# Patient Record
Sex: FEMALE | Race: OTHER | Employment: OTHER | URBAN - METROPOLITAN AREA
[De-identification: names, ages, dates, MRNs, and addresses within clinical notes are randomized per-mention and may not be internally consistent; named-entity substitution may affect disease eponyms.]

---

## 2024-03-13 ENCOUNTER — HOSPITAL ENCOUNTER (OUTPATIENT)
Dept: BONE DENSITY | Facility: HOSPITAL | Age: 74
Discharge: HOME | End: 2024-03-13
Attending: INTERNAL MEDICINE
Payer: MEDICARE

## 2024-03-13 DIAGNOSIS — M81.0 AGE-RELATED OSTEOPOROSIS WITHOUT CURRENT PATHOLOGICAL FRACTURE: ICD-10-CM

## 2024-03-13 PROCEDURE — 77080 DXA BONE DENSITY AXIAL: CPT | Performed by: INTERNAL MEDICINE

## 2024-05-12 ENCOUNTER — APPOINTMENT (OUTPATIENT)
Dept: CT IMAGING | Facility: HOSPITAL | Age: 74
End: 2024-05-12
Attending: EMERGENCY MEDICINE

## 2024-05-12 ENCOUNTER — HOSPITAL ENCOUNTER (EMERGENCY)
Facility: HOSPITAL | Age: 74
Discharge: HOME | End: 2024-05-12
Attending: EMERGENCY MEDICINE

## 2024-05-12 VITALS
TEMPERATURE: 98 F | DIASTOLIC BLOOD PRESSURE: 63 MMHG | BODY MASS INDEX: 45.36 KG/M2 | SYSTOLIC BLOOD PRESSURE: 129 MMHG | OXYGEN SATURATION: 95 % | HEIGHT: 59 IN | WEIGHT: 225 LBS | RESPIRATION RATE: 22 BRPM | HEART RATE: 78 BPM

## 2024-05-12 DIAGNOSIS — R41.0 CONFUSION: ICD-10-CM

## 2024-05-12 DIAGNOSIS — D72.829 LEUKOCYTOSIS, UNSPECIFIED TYPE: ICD-10-CM
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
## 2024-05-12 LAB
ANION GAP SERPL CALC-SCNC: 6 MMOL/L (ref 0–18)
BASOPHILS # BLD AUTO: 0.12 X10(3) UL (ref 0–0.2)
BASOPHILS NFR BLD AUTO: 0.8 %
BILIRUB UR QL: NEGATIVE
BUN BLD-MCNC: 11 MG/DL (ref 9–23)
BUN/CREAT SERPL: 12.4 (ref 10–20)
CALCIUM BLD-MCNC: 9.5 MG/DL (ref 8.7–10.4)
CHLORIDE SERPL-SCNC: 107 MMOL/L (ref 98–112)
CLARITY UR: CLEAR
CO2 SERPL-SCNC: 27 MMOL/L (ref 21–32)
COLOR UR: YELLOW
CREAT BLD-MCNC: 0.89 MG/DL
DEPRECATED RDW RBC AUTO: 49.6 FL (ref 35.1–46.3)
EGFRCR SERPLBLD CKD-EPI 2021: 69 ML/MIN/1.73M2 (ref 60–?)
EOSINOPHIL # BLD AUTO: 0.78 X10(3) UL (ref 0–0.7)
EOSINOPHIL NFR BLD AUTO: 5.4 %
ERYTHROCYTE [DISTWIDTH] IN BLOOD BY AUTOMATED COUNT: 15 % (ref 11–15)
GLUCOSE BLD-MCNC: 268 MG/DL (ref 70–99)
GLUCOSE BLDC GLUCOMTR-MCNC: 270 MG/DL (ref 70–99)
GLUCOSE UR-MCNC: 200 MG/DL
HCT VFR BLD AUTO: 45.9 %
HGB BLD-MCNC: 14.7 G/DL
HGB UR QL STRIP.AUTO: NEGATIVE
IMM GRANULOCYTES # BLD AUTO: 0.17 X10(3) UL (ref 0–1)
IMM GRANULOCYTES NFR BLD: 1.2 %
KETONES UR-MCNC: NEGATIVE MG/DL
LEUKOCYTE ESTERASE UR QL STRIP.AUTO: 500
LYMPHOCYTES # BLD AUTO: 2.21 X10(3) UL (ref 1–4)
LYMPHOCYTES NFR BLD AUTO: 15.3 %
MCH RBC QN AUTO: 29.2 PG (ref 26–34)
MCHC RBC AUTO-ENTMCNC: 32 G/DL (ref 31–37)
MCV RBC AUTO: 91.3 FL
MONOCYTES # BLD AUTO: 1.13 X10(3) UL (ref 0.1–1)
MONOCYTES NFR BLD AUTO: 7.8 %
NEUTROPHILS # BLD AUTO: 10.01 X10 (3) UL (ref 1.5–7.7)
NEUTROPHILS # BLD AUTO: 10.01 X10(3) UL (ref 1.5–7.7)
NEUTROPHILS NFR BLD AUTO: 69.5 %
NITRITE UR QL STRIP.AUTO: NEGATIVE
OSMOLALITY SERPL CALC.SUM OF ELEC: 299 MOSM/KG (ref 275–295)
PH UR: 5.5 [PH] (ref 5–8)
PLATELET # BLD AUTO: 244 10(3)UL (ref 150–450)
POTASSIUM SERPL-SCNC: 3.9 MMOL/L (ref 3.5–5.1)
RBC # BLD AUTO: 5.03 X10(6)UL
SODIUM SERPL-SCNC: 140 MMOL/L (ref 136–145)
SP GR UR STRIP: 1.02 (ref 1–1.03)
UROBILINOGEN UR STRIP-ACNC: NORMAL
WBC # BLD AUTO: 14.4 X10(3) UL (ref 4–11)
WBC #/AREA URNS AUTO: >50 /HPF

## 2024-05-12 PROCEDURE — 99285 EMERGENCY DEPT VISIT HI MDM: CPT

## 2024-05-12 PROCEDURE — 70450 CT HEAD/BRAIN W/O DYE: CPT | Performed by: EMERGENCY MEDICINE

## 2024-05-12 PROCEDURE — 82962 GLUCOSE BLOOD TEST: CPT

## 2024-05-12 PROCEDURE — 93010 ELECTROCARDIOGRAM REPORT: CPT

## 2024-05-12 PROCEDURE — 81001 URINALYSIS AUTO W/SCOPE: CPT | Performed by: EMERGENCY MEDICINE

## 2024-05-12 PROCEDURE — 87077 CULTURE AEROBIC IDENTIFY: CPT | Performed by: EMERGENCY MEDICINE

## 2024-05-12 PROCEDURE — 36415 COLL VENOUS BLD VENIPUNCTURE: CPT

## 2024-05-12 PROCEDURE — 93005 ELECTROCARDIOGRAM TRACING: CPT

## 2024-05-12 PROCEDURE — 85025 COMPLETE CBC W/AUTO DIFF WBC: CPT | Performed by: EMERGENCY MEDICINE

## 2024-05-12 PROCEDURE — 80048 BASIC METABOLIC PNL TOTAL CA: CPT | Performed by: EMERGENCY MEDICINE

## 2024-05-12 PROCEDURE — 87086 URINE CULTURE/COLONY COUNT: CPT | Performed by: EMERGENCY MEDICINE

## 2024-05-12 PROCEDURE — 72125 CT NECK SPINE W/O DYE: CPT | Performed by: EMERGENCY MEDICINE

## 2024-05-12 PROCEDURE — 87186 SC STD MICRODIL/AGAR DIL: CPT | Performed by: EMERGENCY MEDICINE

## 2024-05-12 RX ORDER — ASPIRIN 81 MG/1
81 TABLET, CHEWABLE ORAL DAILY
COMMUNITY

## 2024-05-12 RX ORDER — SENNA AND DOCUSATE SODIUM 50; 8.6 MG/1; MG/1
1 TABLET, FILM COATED ORAL DAILY
COMMUNITY

## 2024-05-12 RX ORDER — ONDANSETRON 4 MG/1
4 TABLET, FILM COATED ORAL EVERY 8 HOURS PRN
COMMUNITY

## 2024-05-12 NOTE — ED QUICK NOTES
Pt family member @ bedside. States patient has had worsening confusion over the last few weeks. Also states patient's WBC has increased.

## 2024-05-12 NOTE — ED PROVIDER NOTES
Patient Seen in: [facility] Emergency Department    History     Chief Complaint   Patient presents with    Fall       HPI    History is provided by patient/independent historian: Patient, EMS  73 year old female with history of depression, diabetes, GERD, here with complaints of unwitnessed fall yesterday.  Patient recalls getting up from the toilet and falling backwards, hitting her head between the toilet bowl and wall.  No loss consciousness.  She is on aspirin daily.  No neck pain.  No other injuries.  EMS reports she is not ambulatory and normally wheelchair-bound.    History reviewed.   Past Medical History:    Depression    Diabetes (HCC)    Esophageal reflux         History reviewed.   Past Surgical History:   Procedure Laterality Date    Removal gallbladder      Thyroidectomy           Home Medications reviewed :  (Not in a hospital admission)        History reviewed.   Social History     Socioeconomic History    Marital status: Single   Tobacco Use    Smoking status: Former     Types: Cigarettes    Smokeless tobacco: Never   Vaping Use    Vaping status: Never Used   Substance and Sexual Activity    Alcohol use: Not Currently    Drug use: Never         ROS  Review of Systems   Respiratory:  Negative for shortness of breath.    Cardiovascular:  Negative for chest pain.   Neurological:  Positive for headaches.   All other systems reviewed and are negative.     All other pertinent organ systems are reviewed and are negative.      Physical Exam     ED Triage Vitals   BP 05/12/24 1759 124/57   Pulse 05/12/24 1759 79   Resp 05/12/24 1759 23   Temp 05/12/24 1759 98 °F (36.7 °C)   Temp src 05/12/24 1759 Temporal   SpO2 05/12/24 1759 99 %   O2 Device 05/12/24 1830 None (Room air)     Vital signs reviewed.      Physical Exam  Vitals and nursing note reviewed.   Neck:      Comments: No cspine tenderness  Cardiovascular:      Pulses: Normal pulses.   Pulmonary:      Effort: No respiratory distress.    Abdominal:      General: There is no distension.   Musculoskeletal:      Comments: No spinal tenderness, pelvis stable   Neurological:      Mental Status: She is alert.      Comments: Confused, moving all extremities with equal strength, normal sensation to light touch in all extremities         ED Course       Labs:     Labs Reviewed   BASIC METABOLIC PANEL (8) - Abnormal; Notable for the following components:       Result Value    Glucose 268 (*)     Calculated Osmolality 299 (*)     All other components within normal limits   URINALYSIS WITH CULTURE REFLEX - Abnormal; Notable for the following components:    Glucose Urine 200 (*)     Protein Urine Trace (*)     Leukocyte Esterase Urine 500 (*)     WBC Urine >50 (*)     Bacteria Urine Rare (*)     All other components within normal limits   POCT GLUCOSE - Abnormal; Notable for the following components:    POC Glucose  270 (*)     All other components within normal limits   CBC W/ DIFFERENTIAL - Abnormal; Notable for the following components:    WBC 14.4 (*)     RDW-SD 49.6 (*)     Neutrophil Absolute Prelim 10.01 (*)     Neutrophil Absolute 10.01 (*)     Monocyte Absolute 1.13 (*)     Eosinophil Absolute 0.78 (*)     All other components within normal limits   CBC WITH DIFFERENTIAL WITH PLATELET    Narrative:     The following orders were created for panel order CBC With Differential With Platelet.                  Procedure                               Abnormality         Status                                     ---------                               -----------         ------                                     CBC W/ DIFFERENTIAL[[id number]]          Abnormal            Final result                                                 Please view results for these tests on the individual orders.   URINE CULTURE, ROUTINE         My EKG Interpretation: EKG    Rate, intervals and axes as noted on EKG Report.  Rate: 77  Rhythm: Sinus Rhythm  Reading: normal for  rate, normal for rhythm, no acute ST changes           As reviewed and Interpreted by me      Imaging Results Available and Reviewed while in ED:   CT SPINE CERVICAL (CPT=72125)    Result Date: 5/12/2024  CONCLUSION:  1. No fracture or dislocation. 2. Mild spondylotic changes at C5-C6 resulting in neural foraminal narrowing bilaterally.    Dictated by (CST): Jovani Padilla MD on 5/12/2024 at 8:25 PM     Finalized by (CST): Jovani Padilla MD on 5/12/2024 at 8:27 PM          CT BRAIN OR HEAD (32536)    Result Date: 5/12/2024  CONCLUSION:  1. No acute intracranial process. 2. Moderate generalized atrophy and mild chronic microangiopathic ischemic changes with large vessel calcific atherosclerosis. 3. Subcentimeter mildly dense lesion in the right basal ganglia, which is incompletely characterized on this noncontrast CT.  A small incidental cavernoma is a consideration.  Consider a follow-up pre/post contrast MRI of the brain to attempt more definitive characterization, which can be performed on a nonemergent outpatient basis. 4. Nasal septal perforation.    Dictated by (CST): Jovani Padilla MD on 5/12/2024 at 8:19 PM     Finalized by (CST): Jovani Padilla MD on 5/12/2024 at 8:22 PM         My review and independent interpretation of CT images: no ICH. Radiology report corroborates this in addition to other details as reported by them.      Decision rules/scores evaluated: none      Diagnostic labs/tests considered but not ordered: none    ED Medications Administered: Medications - No data to display         - daughter reports pt was just completed course of antibiotics for leukocytosis at NH - will hold off on treatment of rare bacteriuria and await culture. Discussed need for f/u with neurology given increasing confusion over the last few months and no hx of dementia diagnosis     MDM       Medical Decision Making      Differential Diagnosis: After obtaining the patient's history, performing the  physical exam and reviewing the diagnostics, multiple initial diagnoses were considered based on the presenting problem including UTI, fall, ICH, skull fracture, concussion, khadra, dehydration    External document review: I personally reviewed available external medical records for any recent pertinent discharge summaries, testing, and procedures - the findings are as follows: 3/18/24 admission for generalized weakness    Complicating Factors: The patient already  has a past medical history of Depression, Diabetes (HCC), and Esophageal reflux. to contribute to the complexity of this ED evaluation.    Procedures performed: none    Discussed management with physician/appropriate source: none    Considered admission/deescalation of care for: fall    Social determinants of health affecting patient care: none    Prescription medications considered: discussed continuing current medication regimen    The patient requires continuous monitoring for: fall    Shared decision making: discussed possible admission        Disposition and Plan     Clinical Impression:  1. Fall, initial encounter    2. Leukocytosis, unspecified type    3. Confusion        Disposition:  Discharge    Follow-up:  your doctor    Follow up        Medications Prescribed:  Current Discharge Medication List

## 2024-05-12 NOTE — ED INITIAL ASSESSMENT (HPI)
Pt to ED from [facility] for an unwitnessed fall yesterday while getting up from the toilet. States she was trying to get her wheelchair and fell backwards, hitting her head on the toilet bowl and wall. Denies LOC. On ASA. Pt arrives A/Ox4, c/o posterior HA.

## 2024-05-13 LAB
ATRIAL RATE: 77 BPM
P AXIS: 27 DEGREES
P-R INTERVAL: 156 MS
Q-T INTERVAL: 410 MS
QRS DURATION: 88 MS
QTC CALCULATION (BEZET): 463 MS
R AXIS: 87 DEGREES
T AXIS: 31 DEGREES
VENTRICULAR RATE: 77 BPM

## 2024-05-13 NOTE — ED QUICK NOTES
Patient is dressed in clothing she came in with. Waiting for transport. Pt still connected to cardiac monitor  and call light within reach.

## 2024-05-15 NOTE — PROGRESS NOTES
ED Culture Callback Results Review    Pharmacist reviewed culture results from ED visit .    Final urine culture positive for untreated E. coli.    No treatment is necessary at this time as culture is suggestive of asymptomatic bacteriuria rather than active infection as discussed with Dr. Stapleton.    Jose Daniel Harley, PharmD  Emergency Medicine Pharmacist Specialist  05/15/24; 1:30 PM